# Patient Record
Sex: FEMALE | Race: WHITE
[De-identification: names, ages, dates, MRNs, and addresses within clinical notes are randomized per-mention and may not be internally consistent; named-entity substitution may affect disease eponyms.]

---

## 2017-01-25 NOTE — HP
ADMITTING HISTORY AND PHYSICAL:

 

DATE OF ADMISSION:  01/30/17

 

AGE:  51 years, female.

 

ADMITTING DIAGNOSIS:  Left renal calculus.

 

PLANNED PROCEDURE:  Shockwave lithotripsy of left renal calculus and left stent 
insertion.

 

SURGEON:  Endy Pillai MD

 

HISTORY OF PRESENT ILLNESS:  Kaykay Mora is a 51-year-old lady who has had 
mild left flank pain for the last 6 to 7 months.  She was noted on an 
ultrasound to have a 1.4-cm calculus in the left kidney and is now being 
brought in for left stent insertion and lithotripsy.

 

PAST MEDICAL HISTORY:  Unremarkable.

 

PAST SURGICAL HISTORY:  Significant for uterine endometrial ablation in 2016.

 

MEDICATIONS:  On admission, none.

 

ALLERGIES:  No known drug allergies.

 

SOCIAL HISTORY:  Smoking history, she is a nonsmoker.

 

REVIEW OF SYSTEMS:  She is otherwise in  excellent health.  There is no history 
of diabetes mellitus or any other major systemic illness.

 

                               PHYSICAL EXAMINATION

 

GENERAL:  Reveals a pleasant healthy-appearing lady.

 

VITAL SIGNS:  Blood pressure 112/70, pulse 75 per minute, temperature 97.5.

 

LUNGS:  Clear bilaterally.

 

CARDIOVASCULAR:  Regular rate and rhythm.  S1, S2.

 

ABDOMEN:  Soft without masses with very mild left flank tenderness.

 

 LABORATORY DATA:  A urine culture was obtained on 01/16/17, which was negative.

 

IMPRESSION:  I had a detailed discussion regarding the procedure of lithotripsy 
and possible risks of bleeding, incomplete fragmentation and possible injury to 
the kidney.  She appears to understand and wishes to proceed as planned.

 

PLAN:  Left stent insertion and shockwave lithotripsy of left renal calculus.

 

 CC:  Dr. Leonardo; Endy Pillai MD *

 

[id number]/CPS #: [id number]

MTDD

## 2017-01-30 ENCOUNTER — HOSPITAL ENCOUNTER (OUTPATIENT)
Dept: HOSPITAL 25 - OR | Age: 52
Discharge: HOME | End: 2017-01-30
Attending: UROLOGY
Payer: COMMERCIAL

## 2017-01-30 VITALS — DIASTOLIC BLOOD PRESSURE: 76 MMHG | SYSTOLIC BLOOD PRESSURE: 154 MMHG

## 2017-01-30 DIAGNOSIS — N20.0: Primary | ICD-10-CM

## 2017-01-30 LAB
MANUAL ENTRY VERIFICATION: (no result)
UR PREG INTERNAL CONTROL: (no result)
UR PREG KIT LOT#: (no result)

## 2017-01-30 PROCEDURE — 74000: CPT

## 2017-01-30 PROCEDURE — 81025 URINE PREGNANCY TEST: CPT

## 2017-01-30 PROCEDURE — C1876 STENT, NON-COA/NON-COV W/DEL: HCPCS

## 2017-01-30 NOTE — RAD
INDICATION:  Postop left ureteral stent placement.



COMPARISON:  Comparison is made with a prior renal ultrasound from December 05, 2016 and a

prior KUB series from January 30, 2017.



TECHNIQUE: Frontal supine films of the abdomen were obtained.



FINDINGS: The small bowel and colon appear nondistended. 



There are calculi in the lower pole of the left kidney spanning over a 1.3 x 0.7 cm area.

There is a double-J stent catheter present on the left side which demonstrates normal

course.



IMPRESSION:  LEFT-SIDED NEPHROLITHIASIS STATUS POST DOUBLE-J STENT CATHETER PLACEMENT.

## 2017-01-30 NOTE — RAD
INDICATION: Left-sided nephrolithiasis     



COMPARISON: January 06, 2017

 

TECHNIQUE: A single view of the abdomen is submitted.



FINDINGS: 



Bones: There are no acute bony findings.



Soft tissues: The soft tissues appear normal. The psoas margins are sharp.



Bowel gas pattern: Normal



Calcifications: There is a left renal calculus measuring 11 mm, unchanged.



Other: None



IMPRESSION: 11 MM LEFT RENAL CALCULUS, UNCHANGED.

## 2017-01-31 NOTE — OP
DATE OF OPERATION:  01/30/17 - [facility] [place]

 

DATE OF BIRTH:  [birth year] - AGE:  51 years, female.

 

SURGEON:  Endy Pillai MD

 

ANESTHESIOLOGIST:  Dr. Blanco.

 

ANESTHESIA:  General.

 

PRE-OP DIAGNOSES:

1.  Left flank pain.

2.  Left renal calculus.

 

POST-OP DIAGNOSES:

1.  Left flank pain.

2.  Left renal calculus.

 

OPERATIVE PROCEDURE:

1.  Cystoscopy, left retrograde pyelogram, left ureteral stent insertion.

2.  Shockwave lithotripsy of left renal calculus.

 

COMPLICATIONS:  None.

 

POSTOPERATIVE CONDITION:  Stable.

 

STENT USED:  A 7-German stent, left ureter.

 

INDICATIONS:  Kaykay Mora is a 51-year-old lady with history of intermittent 
left flank pain and a fairly large, approximately 12-mm calculus in the left 
kidney. She is being brought in for left stent insertion and lithotripsy.  I 
have discussed the procedure in detail including possible risks of bleeding, 
infection, incomplete fragmentation and she appears to understand and wishes to 
proceed as planned.

 

DESCRIPTION OF PROCEDURE:  After induction of general anesthesia, the patient 
was placed in dorsal lithotomy position.  Sequential compression devices were 
in place and functioning.  Initial cystoscopy revealed a normal-appearing 
bladder. Retrograde pyelogram revealed a filling defect consistent with a 
calculus in the lower pole area of the left kidney.  A 7-German stent was 
introduced and positioned under fluoroscopy with good proximal and distal 
positioning obtained.

 

Next, shockwave lithotripsy was commenced at a rate of 90 shocks per minute.  
After the initial 300 shocks, there was a pause in lithotripsy for several 
minutes in an effort to minimize any potential trauma to the kidney.  
Lithotripsy was then resumed and a total of 2400 shocks were administered.  The 
patient tolerated the procedure satisfactorily and was transferred back to the 
recovery area in stable condition.



CC:  Jefe Leonardo MD *

 

 [id number]/[facility] #: [id number]

[facility]

## 2018-11-12 NOTE — HP
CC:  Dr. Leonardo *

 

ADMITTING HISTORY AND PHYSICAL:

 

DATE OF ADMISSION/SURGERY:  11/19/18

 

PREOPERATIVE DIAGNOSIS:  Recurrent left renal calculi.

 

PLANNED PROCEDURE:  Left stent insertion and shockwave lithotripsy of left 
renal calculi.

 

SURGEON:  Endy Pillai MD

 

HISTORY OF PRESENT ILLNESS:  Kaykay Mora is a 53-year-old lady with a history 
of recurrent renal calculi.  She had undergone left stent insertion and 
lithotripsy approximately 2 years ago and had been noticed after that to have 
significant reduction in the size of the previously noted left renal calculi.  
Recently, she was noted to have increasing number and size of left renal 
calculi and is now being brought in for stent insertion and lithotripsy.  Her 
metabolic evaluation had revealed increased urinary calcium, but I am reluctant 
to put her on thiazide or chlorthalidone as she normally runs borderline low to 
normal blood pressure and my concern is with the diuretic, she would get 
hypotensive.

 

PAST MEDICAL HISTORY:  Significant for renal calculi.

 

MEDICATIONS:  On admission, none.

 

ALLERGIES:  No known drug allergies.

 

REVIEW OF SYSTEMS:  She is otherwise in excellent health.  There is no history 
of diabetes mellitus or any other major systemic illness.

 

                               PHYSICAL EXAMINATION

 

GENERAL:  Reveals a pleasant healthy-appearing middle-aged lady.

 

VITAL SIGNS:  Blood pressure is 130/70; pulse 60 per minute, regular; oxygen 
saturation 98% on room air; temperature 97.4.

 

LUNGS:  Clear bilaterally.

 

CARDIOVASCULAR:  Regular rate and rhythm.  S1, S2.

 

ABDOMEN:  Soft with mild left flank tenderness.

 

IMPRESSION:  A 53-year-old lady with nonobstructing left renal calculi which 
has increased in size.  Plan is for left stent insertion and shockwave 
lithotripsy of left renal calculi.  I have discussed the procedure in detail 
including possible risks of bleeding, infection, incomplete fragmentation, and 
she appears to understand and wishes to proceed as planned.

 

[id number]/CPS #: [id number]

MTDD

## 2018-11-19 ENCOUNTER — HOSPITAL ENCOUNTER (OUTPATIENT)
Dept: HOSPITAL 25 - OR | Age: 53
Discharge: HOME | End: 2018-11-19
Attending: UROLOGY
Payer: COMMERCIAL

## 2018-11-19 VITALS — SYSTOLIC BLOOD PRESSURE: 141 MMHG | DIASTOLIC BLOOD PRESSURE: 79 MMHG

## 2018-11-19 DIAGNOSIS — N20.0: Primary | ICD-10-CM

## 2018-11-19 DIAGNOSIS — Z87.442: ICD-10-CM

## 2018-11-19 PROCEDURE — 74018 RADEX ABDOMEN 1 VIEW: CPT

## 2018-11-19 NOTE — OP
CC:  Dr. Leonardo *

 

DATE OF OPERATION:  11/19/18 - [facility]

 

DATE OF BIRTH:  [birth year]

 

SURGEON:  Dr. Pillai.

 

ANESTHESIOLOGIST:  Dr. Wolf.

 

ANESTHESIA:  Spinal.

 

PRE-OP DIAGNOSIS:  Left renal calculus.

 

POST-OP DIAGNOSIS:  Left renal calculus.

 

OPERATIVE PROCEDURE:  Shockwave lithotripsy, left renal calculus.

 

COMPLICATIONS:  None.

 

INDICATIONS:  Kaykay Mora is a 53-year-old lady with a history of recurrent 
renal calculi.  She was recently examined on ultrasound, was noted to have 
increase in the number and size of left renal calculi and is now being brought 
in for repeat lithotripsy.

 

POSTOPERATIVE CONDITION:  Stable.

 

DESCRIPTION OF PROCEDURE:  After induction of spinal anesthesia, the patient 
was placed on the lithotripsy table in supine position.  There was 1 dominant 
calculus with some additional smaller calculi in the lower pole of the left 
kidney. Shockwave lithotripsy was commenced at a rate of 60 shocks per minute.  
After the initial 300 shocks, there was a pause in lithotripsy for several 
minutes in an effort to minimize any potential trauma to the kidney.  
Lithotripsy was then resumed and periodic imaging revealed good localization 
and fragmentation.  A total of 2200 shocks were administered.  Since the 
fragmentation appeared to be fairly satisfactory, I elected not to proceed with 
a stent insertion and at the end of the procedure, the patient was transferred 
back to the recovery area in stable condition.

 

 [id number]/CPS #: [id number]

MTDD